# Patient Record
Sex: FEMALE | Race: WHITE | NOT HISPANIC OR LATINO | URBAN - METROPOLITAN AREA
[De-identification: names, ages, dates, MRNs, and addresses within clinical notes are randomized per-mention and may not be internally consistent; named-entity substitution may affect disease eponyms.]

---

## 2018-10-10 ENCOUNTER — APPOINTMENT (RX ONLY)
Dept: URBAN - METROPOLITAN AREA CLINIC 11 | Facility: CLINIC | Age: 43
Setting detail: DERMATOLOGY
End: 2018-10-10

## 2018-10-10 DIAGNOSIS — Z41.1 ENCOUNTER FOR COSMETIC SURGERY: ICD-10-CM

## 2018-10-10 DIAGNOSIS — Z48.89 ENCOUNTER FOR OTHER SPECIFIED SURGICAL AFTERCARE: ICD-10-CM

## 2018-10-10 PROCEDURE — 99024 POSTOP FOLLOW-UP VISIT: CPT

## 2018-10-10 PROCEDURE — ? COUNSELING - POST-OP CHECK, BREAST AUGMENTATION

## 2018-10-10 PROCEDURE — ? CONSULTATION - COSMETIC CAPSULAR CONTRACTURE

## 2018-10-10 ASSESSMENT — BAKER GRADE, LEFT BREAST
IN YOUR EXPERIENCE, AMONG ALL PATIENTS YOU HAVE SEEN WITH THIS CONDITION, HOW SEVERE IS THE LEFT BREAST CONTRACTURE?: NORMALLY SOFT AND NATURAL IN SIZE AND SHAPE

## 2018-10-10 ASSESSMENT — BAKER GRADE, RIGHT BREAST
IN YOUR EXPERIENCE, AMONG ALL PATIENTS YOU HAVE SEEN WITH THIS CONDITION, HOW SEVERE IS THE RIGHT BREAST CONTRACTURE?: SOMEWHAT FIRM, BUT NORMAL IN APPEARANCE

## 2018-10-10 ASSESSMENT — LOCATION ZONE DERM: LOCATION ZONE: TRUNK

## 2018-10-10 ASSESSMENT — LOCATION DETAILED DESCRIPTION DERM
LOCATION DETAILED: LEFT MEDIAL BREAST 8-9:00 REGION
LOCATION DETAILED: RIGHT MEDIAL BREAST 5-6:00 REGION
LOCATION DETAILED: RIGHT MEDIAL BREAST 4-5:00 REGION

## 2018-10-10 ASSESSMENT — LOCATION SIMPLE DESCRIPTION DERM
LOCATION SIMPLE: LEFT BREAST
LOCATION SIMPLE: RIGHT BREAST

## 2021-01-14 ENCOUNTER — APPOINTMENT (RX ONLY)
Dept: URBAN - METROPOLITAN AREA CLINIC 11 | Facility: CLINIC | Age: 46
Setting detail: DERMATOLOGY
End: 2021-01-14

## 2021-01-14 DIAGNOSIS — Z41.1 ENCOUNTER FOR COSMETIC SURGERY: ICD-10-CM

## 2021-01-14 DIAGNOSIS — Z48.89 ENCOUNTER FOR OTHER SPECIFIED SURGICAL AFTERCARE: ICD-10-CM

## 2021-01-14 PROCEDURE — ? CODE 99024 - NO CHARGE POST-OP VISIT

## 2021-01-14 PROCEDURE — ? CONSULTATION - COSMETIC CAPSULAR CONTRACTURE

## 2021-01-14 PROCEDURE — 99024 POSTOP FOLLOW-UP VISIT: CPT

## 2021-01-14 PROCEDURE — ? COUNSELING - POST-OP CHECK, BREAST AUGMENTATION

## 2021-01-14 ASSESSMENT — BAKER GRADE, LEFT BREAST
IN YOUR EXPERIENCE, AMONG ALL PATIENTS YOU HAVE SEEN WITH THIS CONDITION, HOW SEVERE IS THE LEFT BREAST CONTRACTURE?: SOMEWHAT FIRM, BUT NORMAL IN APPEARANCE

## 2021-01-14 NOTE — HPI: POST-OP CHECK, COSMETIC (BRIEF)
What Is Your Overall Satisfaction Level With The Current Outcome?: satisfied
Date Of Procedure: 2004

## 2021-01-14 NOTE — PROCEDURE: REASSURANCE
Follow-Up Increment: 1
Follow-Up Interval: year
Follow-Up: yes
Information Provided: The examination is within normal limits today.
Detail Level: Simple